# Patient Record
Sex: MALE | Race: OTHER | Employment: OTHER | URBAN - METROPOLITAN AREA
[De-identification: names, ages, dates, MRNs, and addresses within clinical notes are randomized per-mention and may not be internally consistent; named-entity substitution may affect disease eponyms.]

---

## 2020-01-01 ENCOUNTER — APPOINTMENT (OUTPATIENT)
Dept: CV DIAGNOSTICS | Facility: HOSPITAL | Age: 80
DRG: 177 | End: 2020-01-01
Attending: INTERNAL MEDICINE
Payer: MEDICARE

## 2020-01-01 ENCOUNTER — APPOINTMENT (OUTPATIENT)
Dept: CT IMAGING | Facility: HOSPITAL | Age: 80
End: 2020-01-01
Attending: EMERGENCY MEDICINE
Payer: MEDICARE

## 2020-01-01 ENCOUNTER — APPOINTMENT (OUTPATIENT)
Dept: GENERAL RADIOLOGY | Facility: HOSPITAL | Age: 80
End: 2020-01-01
Attending: EMERGENCY MEDICINE
Payer: MEDICARE

## 2020-01-01 ENCOUNTER — HOSPITAL ENCOUNTER (INPATIENT)
Facility: HOSPITAL | Age: 80
LOS: 2 days | DRG: 177 | End: 2020-01-01
Attending: HOSPITALIST | Admitting: HOSPITALIST
Payer: OTHER MISCELLANEOUS

## 2020-01-01 ENCOUNTER — APPOINTMENT (OUTPATIENT)
Dept: GENERAL RADIOLOGY | Facility: HOSPITAL | Age: 80
DRG: 177 | End: 2020-01-01
Attending: HOSPITALIST
Payer: MEDICARE

## 2020-01-01 ENCOUNTER — APPOINTMENT (OUTPATIENT)
Dept: NUCLEAR MEDICINE | Facility: HOSPITAL | Age: 80
DRG: 177 | End: 2020-01-01
Attending: INTERNAL MEDICINE
Payer: MEDICARE

## 2020-01-01 ENCOUNTER — HOSPITAL ENCOUNTER (EMERGENCY)
Facility: HOSPITAL | Age: 80
Discharge: HOME | End: 2020-01-01
Attending: EMERGENCY MEDICINE
Payer: MEDICARE

## 2020-01-01 ENCOUNTER — APPOINTMENT (OUTPATIENT)
Dept: GENERAL RADIOLOGY | Facility: HOSPITAL | Age: 80
DRG: 177 | End: 2020-01-01
Attending: INTERNAL MEDICINE
Payer: MEDICARE

## 2020-01-01 ENCOUNTER — APPOINTMENT (OUTPATIENT)
Dept: GENERAL RADIOLOGY | Facility: HOSPITAL | Age: 80
DRG: 177 | End: 2020-01-01
Attending: EMERGENCY MEDICINE
Payer: MEDICARE

## 2020-01-01 ENCOUNTER — HOSPITAL ENCOUNTER (INPATIENT)
Facility: HOSPITAL | Age: 80
LOS: 6 days | Discharge: HOSPICE | DRG: 177 | End: 2020-01-01
Attending: EMERGENCY MEDICINE | Admitting: HOSPITALIST
Payer: MEDICARE

## 2020-01-01 VITALS
TEMPERATURE: 98 F | DIASTOLIC BLOOD PRESSURE: 81 MMHG | HEIGHT: 68 IN | SYSTOLIC BLOOD PRESSURE: 146 MMHG | BODY MASS INDEX: 25.76 KG/M2 | HEART RATE: 80 BPM | WEIGHT: 170 LBS | OXYGEN SATURATION: 98 % | RESPIRATION RATE: 16 BRPM

## 2020-01-01 VITALS
OXYGEN SATURATION: 91 % | RESPIRATION RATE: 18 BRPM | TEMPERATURE: 98 F | SYSTOLIC BLOOD PRESSURE: 51 MMHG | DIASTOLIC BLOOD PRESSURE: 36 MMHG | HEART RATE: 82 BPM

## 2020-01-01 VITALS
HEIGHT: 69 IN | DIASTOLIC BLOOD PRESSURE: 74 MMHG | TEMPERATURE: 98 F | SYSTOLIC BLOOD PRESSURE: 134 MMHG | RESPIRATION RATE: 38 BRPM | OXYGEN SATURATION: 88 % | WEIGHT: 110.69 LBS | HEART RATE: 86 BPM | BODY MASS INDEX: 16.4 KG/M2
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
## 2020-01-01 DIAGNOSIS — I50.9 ACUTE ON CHRONIC CONGESTIVE HEART FAILURE, UNSPECIFIED HEART FAILURE TYPE (HCC): Primary | ICD-10-CM

## 2020-01-01 DIAGNOSIS — T14.8XXA MULTIPLE SKIN TEARS: Primary | ICD-10-CM

## 2020-01-01 PROCEDURE — 99222 1ST HOSP IP/OBS MODERATE 55: CPT | Performed by: REGISTERED NURSE

## 2020-01-01 PROCEDURE — 71045 X-RAY EXAM CHEST 1 VIEW: CPT | Performed by: HOSPITALIST

## 2020-01-01 PROCEDURE — 70450 CT HEAD/BRAIN W/O DYE: CPT | Performed by: EMERGENCY MEDICINE

## 2020-01-01 PROCEDURE — 99233 SBSQ HOSP IP/OBS HIGH 50: CPT | Performed by: HOSPITALIST

## 2020-01-01 PROCEDURE — 71045 X-RAY EXAM CHEST 1 VIEW: CPT | Performed by: EMERGENCY MEDICINE

## 2020-01-01 PROCEDURE — 78452 HT MUSCLE IMAGE SPECT MULT: CPT | Performed by: INTERNAL MEDICINE

## 2020-01-01 PROCEDURE — 99233 SBSQ HOSP IP/OBS HIGH 50: CPT | Performed by: INTERNAL MEDICINE

## 2020-01-01 PROCEDURE — 99223 1ST HOSP IP/OBS HIGH 75: CPT | Performed by: HOSPITALIST

## 2020-01-01 PROCEDURE — 93306 TTE W/DOPPLER COMPLETE: CPT | Performed by: INTERNAL MEDICINE

## 2020-01-01 PROCEDURE — 72125 CT NECK SPINE W/O DYE: CPT | Performed by: EMERGENCY MEDICINE

## 2020-01-01 PROCEDURE — 99222 1ST HOSP IP/OBS MODERATE 55: CPT | Performed by: HOSPITALIST

## 2020-01-01 PROCEDURE — 71046 X-RAY EXAM CHEST 2 VIEWS: CPT | Performed by: INTERNAL MEDICINE

## 2020-01-01 PROCEDURE — 74230 X-RAY XM SWLNG FUNCJ C+: CPT | Performed by: INTERNAL MEDICINE

## 2020-01-01 PROCEDURE — 73502 X-RAY EXAM HIP UNI 2-3 VIEWS: CPT | Performed by: EMERGENCY MEDICINE

## 2020-01-01 PROCEDURE — 73560 X-RAY EXAM OF KNEE 1 OR 2: CPT | Performed by: EMERGENCY MEDICINE

## 2020-01-01 PROCEDURE — 99284 EMERGENCY DEPT VISIT MOD MDM: CPT

## 2020-01-01 PROCEDURE — 93017 CV STRESS TEST TRACING ONLY: CPT | Performed by: INTERNAL MEDICINE

## 2020-01-01 RX ORDER — ATORVASTATIN CALCIUM 10 MG/1
5 TABLET, FILM COATED ORAL NIGHTLY
Status: DISCONTINUED | OUTPATIENT
Start: 2020-01-01 | End: 2020-01-01

## 2020-01-01 RX ORDER — ACETAMINOPHEN 325 MG/1
650 TABLET ORAL EVERY 6 HOURS PRN
COMMUNITY

## 2020-01-01 RX ORDER — DEXTROSE MONOHYDRATE AND SODIUM CHLORIDE 5; .225 G/100ML; G/100ML
INJECTION, SOLUTION INTRAVENOUS CONTINUOUS
Status: DISCONTINUED | OUTPATIENT
Start: 2020-01-01 | End: 2020-01-01

## 2020-01-01 RX ORDER — AMLODIPINE BESYLATE 5 MG/1
5 TABLET ORAL DAILY
COMMUNITY

## 2020-01-01 RX ORDER — SODIUM CHLORIDE 0.9 % (FLUSH) 0.9 %
10 SYRINGE (ML) INJECTION AS NEEDED
Status: DISCONTINUED | OUTPATIENT
Start: 2020-01-01 | End: 2020-01-01

## 2020-01-01 RX ORDER — FUROSEMIDE 10 MG/ML
40 INJECTION INTRAMUSCULAR; INTRAVENOUS ONCE
Status: COMPLETED | OUTPATIENT
Start: 2020-01-01 | End: 2020-01-01

## 2020-01-01 RX ORDER — POTASSIUM CHLORIDE 20 MEQ/1
40 TABLET, EXTENDED RELEASE ORAL EVERY 4 HOURS
Status: COMPLETED | OUTPATIENT
Start: 2020-01-01 | End: 2020-01-01

## 2020-01-01 RX ORDER — ACETAMINOPHEN 325 MG/1
650 TABLET ORAL EVERY 6 HOURS PRN
Status: DISCONTINUED | OUTPATIENT
Start: 2020-01-01 | End: 2020-01-01

## 2020-01-01 RX ORDER — POTASSIUM CHLORIDE 14.9 MG/ML
20 INJECTION INTRAVENOUS ONCE
Status: COMPLETED | OUTPATIENT
Start: 2020-01-01 | End: 2020-01-01

## 2020-01-01 RX ORDER — DOCUSATE SODIUM 100 MG/1
100 CAPSULE, LIQUID FILLED ORAL DAILY
COMMUNITY

## 2020-01-01 RX ORDER — SODIUM CHLORIDE 0.9 % (FLUSH) 0.9 %
3 SYRINGE (ML) INJECTION AS NEEDED
Status: DISCONTINUED | OUTPATIENT
Start: 2020-01-01 | End: 2020-01-01

## 2020-01-01 RX ORDER — DOCUSATE SODIUM 100 MG/1
100 CAPSULE, LIQUID FILLED ORAL DAILY PRN
Status: DISCONTINUED | OUTPATIENT
Start: 2020-01-01 | End: 2020-01-01

## 2020-01-01 RX ORDER — MORPHINE SULFATE 2 MG/ML
1 INJECTION, SOLUTION INTRAMUSCULAR; INTRAVENOUS ONCE
Status: DISCONTINUED | OUTPATIENT
Start: 2020-01-01 | End: 2020-01-01

## 2020-01-01 RX ORDER — METOPROLOL SUCCINATE 25 MG/1
25 TABLET, EXTENDED RELEASE ORAL DAILY
COMMUNITY

## 2020-01-01 RX ORDER — HEPARIN SODIUM 5000 [USP'U]/ML
5000 INJECTION, SOLUTION INTRAVENOUS; SUBCUTANEOUS EVERY 12 HOURS SCHEDULED
Status: DISCONTINUED | OUTPATIENT
Start: 2020-01-01 | End: 2020-01-01

## 2020-01-01 RX ORDER — ATROPINE SULFATE 10 MG/ML
2 SOLUTION/ DROPS OPHTHALMIC EVERY 2 HOUR PRN
Status: DISCONTINUED | OUTPATIENT
Start: 2020-01-01 | End: 2020-01-01

## 2020-01-01 RX ORDER — FUROSEMIDE 40 MG/1
40 TABLET ORAL EVERY 8 HOURS PRN
Status: DISCONTINUED | OUTPATIENT
Start: 2020-01-01 | End: 2020-01-01

## 2020-01-01 RX ORDER — FUROSEMIDE 10 MG/ML
40 INJECTION INTRAMUSCULAR; INTRAVENOUS EVERY 8 HOURS PRN
Status: DISCONTINUED | OUTPATIENT
Start: 2020-01-01 | End: 2020-01-01

## 2020-01-01 RX ORDER — HALOPERIDOL 5 MG/ML
1 INJECTION INTRAMUSCULAR
Status: DISCONTINUED | OUTPATIENT
Start: 2020-01-01 | End: 2020-01-01

## 2020-01-01 RX ORDER — ONDANSETRON 2 MG/ML
4 INJECTION INTRAMUSCULAR; INTRAVENOUS EVERY 6 HOURS PRN
Status: DISCONTINUED | OUTPATIENT
Start: 2020-01-01 | End: 2020-01-01

## 2020-01-01 RX ORDER — POTASSIUM CHLORIDE 29.8 MG/ML
INJECTION INTRAVENOUS
Status: DISPENSED
Start: 2020-01-01 | End: 2020-01-01

## 2020-01-01 RX ORDER — MINERAL OIL AND PETROLATUM 150; 830 MG/G; MG/G
OINTMENT OPHTHALMIC 4 TIMES DAILY
Status: DISCONTINUED | OUTPATIENT
Start: 2020-01-01 | End: 2020-01-01

## 2020-01-01 RX ORDER — SODIUM CHLORIDE 9 MG/ML
INJECTION, SOLUTION INTRAVENOUS CONTINUOUS
Status: DISCONTINUED | OUTPATIENT
Start: 2020-01-01 | End: 2020-01-01

## 2020-01-01 RX ORDER — GLYCOPYRROLATE 0.2 MG/ML
0.4 INJECTION, SOLUTION INTRAMUSCULAR; INTRAVENOUS
Status: DISCONTINUED | OUTPATIENT
Start: 2020-01-01 | End: 2020-01-01

## 2020-01-01 RX ORDER — SCOLOPAMINE TRANSDERMAL SYSTEM 1 MG/1
1 PATCH, EXTENDED RELEASE TRANSDERMAL
Status: DISCONTINUED | OUTPATIENT
Start: 2020-01-01 | End: 2020-01-01

## 2020-01-01 RX ORDER — MORPHINE SULFATE 2 MG/ML
1 INJECTION, SOLUTION INTRAMUSCULAR; INTRAVENOUS EVERY 4 HOURS PRN
Status: DISCONTINUED | OUTPATIENT
Start: 2020-01-01 | End: 2020-01-01

## 2020-01-01 RX ORDER — LORAZEPAM 2 MG/ML
2 INJECTION INTRAMUSCULAR EVERY 4 HOURS PRN
Status: DISCONTINUED | OUTPATIENT
Start: 2020-01-01 | End: 2020-01-01

## 2020-01-01 RX ORDER — METOPROLOL SUCCINATE 25 MG/1
25 TABLET, EXTENDED RELEASE ORAL DAILY
Status: DISCONTINUED | OUTPATIENT
Start: 2020-01-01 | End: 2020-01-01

## 2020-01-01 RX ORDER — IPRATROPIUM BROMIDE AND ALBUTEROL SULFATE 2.5; .5 MG/3ML; MG/3ML
3 SOLUTION RESPIRATORY (INHALATION) EVERY 6 HOURS PRN
Status: DISCONTINUED | OUTPATIENT
Start: 2020-01-01 | End: 2020-01-01

## 2020-01-01 RX ORDER — MULTIVIT-MIN/IRON FUM/FOLIC AC 7.5 MG-4
1 TABLET ORAL DAILY
COMMUNITY

## 2020-01-01 RX ORDER — ATORVASTATIN CALCIUM 10 MG/1
5 TABLET, FILM COATED ORAL NIGHTLY
COMMUNITY

## 2020-01-01 RX ORDER — MELATONIN
325
COMMUNITY

## 2020-01-01 RX ORDER — BISACODYL 10 MG
10 SUPPOSITORY, RECTAL RECTAL
Status: DISCONTINUED | OUTPATIENT
Start: 2020-01-01 | End: 2020-01-01

## 2020-01-01 RX ORDER — ASPIRIN 81 MG
10 TABLET, DELAYED RELEASE (ENTERIC COATED) ORAL EVERY 4 HOURS PRN
COMMUNITY

## 2020-01-01 RX ORDER — METOCLOPRAMIDE HYDROCHLORIDE 5 MG/ML
5 INJECTION INTRAMUSCULAR; INTRAVENOUS EVERY 8 HOURS PRN
Status: DISCONTINUED | OUTPATIENT
Start: 2020-01-01 | End: 2020-01-01

## 2020-01-01 RX ORDER — MIRTAZAPINE 15 MG/1
15 TABLET, FILM COATED ORAL NIGHTLY
Status: DISCONTINUED | OUTPATIENT
Start: 2020-01-01 | End: 2020-01-01

## 2020-01-01 RX ORDER — AMIODARONE HYDROCHLORIDE 200 MG/1
200 TABLET ORAL DAILY
Status: DISCONTINUED | OUTPATIENT
Start: 2020-01-01 | End: 2020-01-01

## 2020-01-01 RX ORDER — AMIODARONE HYDROCHLORIDE 200 MG/1
200 TABLET ORAL 2 TIMES DAILY
Status: DISCONTINUED | OUTPATIENT
Start: 2020-01-01 | End: 2020-01-01

## 2020-01-01 RX ORDER — MINERAL OIL AND PETROLATUM 150; 830 MG/G; MG/G
OINTMENT OPHTHALMIC 4 TIMES DAILY
COMMUNITY

## 2020-01-01 RX ORDER — TUBERCULIN PURIFIED PROTEIN DERIVATIVE 5 [IU]/.1ML
0.1 INJECTION INTRADERMAL ONCE
COMMUNITY

## 2020-01-01 RX ORDER — MORPHINE SULFATE 2 MG/ML
1 INJECTION, SOLUTION INTRAMUSCULAR; INTRAVENOUS
Status: DISCONTINUED | OUTPATIENT
Start: 2020-01-01 | End: 2020-01-01

## 2020-01-01 RX ORDER — MORPHINE SULFATE IN 0.9 % NACL 1 MG/ML
1 PLASTIC BAG, INJECTION (ML) INTRAVENOUS CONTINUOUS PRN
Status: DISCONTINUED | OUTPATIENT
Start: 2020-01-01 | End: 2020-01-01

## 2020-01-01 RX ORDER — LORAZEPAM 2 MG/ML
1 INJECTION INTRAMUSCULAR EVERY 4 HOURS PRN
Status: DISCONTINUED | OUTPATIENT
Start: 2020-01-01 | End: 2020-01-01

## 2020-01-01 RX ORDER — LORAZEPAM 2 MG/ML
0.5 INJECTION INTRAMUSCULAR EVERY 4 HOURS PRN
Status: DISCONTINUED | OUTPATIENT
Start: 2020-01-01 | End: 2020-01-01

## 2020-01-01 RX ORDER — POTASSIUM CHLORIDE 750 MG/1
10 TABLET, EXTENDED RELEASE ORAL DAILY
COMMUNITY

## 2020-01-01 RX ORDER — QUETIAPINE 25 MG/1
25 TABLET, FILM COATED ORAL NIGHTLY
Status: DISCONTINUED | OUTPATIENT
Start: 2020-01-01 | End: 2020-01-01

## 2020-01-01 RX ORDER — ONDANSETRON 2 MG/ML
4 INJECTION INTRAMUSCULAR; INTRAVENOUS EVERY 4 HOURS PRN
Status: DISCONTINUED | OUTPATIENT
Start: 2020-01-01 | End: 2020-01-01 | Stop reason: HOSPADM

## 2020-01-01 RX ORDER — POTASSIUM CHLORIDE 1.5 G/1.77G
40 POWDER, FOR SOLUTION ORAL ONCE
Status: COMPLETED | OUTPATIENT
Start: 2020-01-01 | End: 2020-01-01

## 2020-01-01 RX ORDER — HALOPERIDOL 5 MG/ML
2 INJECTION INTRAMUSCULAR
Status: DISCONTINUED | OUTPATIENT
Start: 2020-01-01 | End: 2020-01-01

## 2020-01-01 RX ORDER — DEXTROSE MONOHYDRATE 25 G/50ML
50 INJECTION, SOLUTION INTRAVENOUS
Status: DISCONTINUED | OUTPATIENT
Start: 2020-01-01 | End: 2020-01-01

## 2020-01-01 RX ORDER — MIRTAZAPINE 15 MG/1
15 TABLET, FILM COATED ORAL NIGHTLY
COMMUNITY

## 2020-01-01 RX ORDER — AMIODARONE HYDROCHLORIDE 200 MG/1
200 TABLET ORAL 2 TIMES DAILY
COMMUNITY

## 2020-02-23 NOTE — ED INITIAL ASSESSMENT (HPI)
Pt arrive in ER from [facility] per [facility] with c/o tripped and fell while trying to get out in bed pt hit his head, c/o right hip pain + right knee pain, denies loc, pt has h/o Dementia and on aspirin+Eliquis,aox3

## 2020-02-23 NOTE — ED PROVIDER NOTES
Patient Seen in: [facility] AND [facility] Emergency Department      History   Patient presents with:  Fall    Stated Complaint: fall    HPI    Patient is an 58-year-old male who arrives by EMS from [facility] for unwitnessed fall.   He was found on the f brain/cspine: Small left frontal scalp hematoma. No acute intracranial bleed or calvarial fracture. No acute cervical spine fracture or malalignment. Multilevel degenerative changes with shallow disc bulges and herniations.   No acute fracture of right k

## 2020-02-23 NOTE — ED NOTES
Pt will be discharge back to [facility] per [address], report given to [facility], voices no concern, awaiting for [facility] to return pt back to [facility]

## 2020-02-23 NOTE — CM/SW NOTE
[facility] form completed and copy placed in medical records tray for scanning. RN aware.

## 2020-09-25 PROBLEM — I50.9 ACUTE ON CHRONIC CONGESTIVE HEART FAILURE, UNSPECIFIED HEART FAILURE TYPE (HCC): Status: ACTIVE | Noted: 2020-01-01

## 2020-09-25 PROBLEM — I50.9 ACUTE ON CHRONIC CONGESTIVE HEART FAILURE (HCC): Status: ACTIVE | Noted: 2020-01-01

## 2020-09-25 NOTE — PROGRESS NOTES
Pt received awake but confused. Has dementia at baseline. From [facility]. With dysphagia risk factors so made NPO. Swallow eval already ordered. Per RN at [facility], pt is an assist to wheel chair but unable to ambulate. Fluids and IV zosyn.  Educated

## 2020-09-25 NOTE — ED NOTES
Orders for admission, patient is aware of plan and ready to go upstairs. Any questions, please call ED MARITZA Santana  at extension [phone].    Type of COVID test sent:  COVID Suspicion level: Low    Titratable drug(s) infusing:NONE  Rate:    LOC at time of transpo

## 2020-09-25 NOTE — ED NOTES
The writer called [facility] update and report given, to May RN    The writer also spoke with pt's Daughter Jefferson Yeung, update given.   Contact # [phone]

## 2020-09-25 NOTE — ED PROVIDER NOTES
Patient Seen in: [facility] AND [facility] Emergency Department    History   No chief complaint on file. HPI    Patient presents to the ED complaining of shortness of breath.   Apparently started on Zosyn 3 days ago at his care facility for suspected pneumon arrival to the room.  Personal protective equipment including droplet mask, eye protection, and gloves were worn throughout the duration of the exam.  Handwashing was performed prior to and after the exam.  Stethoscope and any equipment used during my exami Absolute Prelim 9.87 (*)     Neutrophil Absolute 9.87 (*)     Lymphocyte Absolute 0.38 (*)     All other components within normal limits   RAPID SARS-COV-2 BY PCR - Normal   CBC WITH DIFFERENTIAL WITH PLATELET    Narrative:      The following orders were cr records for any recent pertinent discharge summaries, testing, and procedures and reviewed those reports. Complicating Factors: The patient already has does not have any pertinent problems on file. to contribute to the complexity of this ED evaluation.

## 2020-09-25 NOTE — CONSULTS
[facility] HOSP - [facility]    Report of Consultation    Fanta Adam Patient Status:  Emergency    1940 MRN [id number]   Location [address] Attending Jareth Flores MD   Hosp Day # 0 PCP Kanu Childs     Date of shortness of breath. He is losing weight and not eating well. From old records at [facility] he had normal LV function in the past.  He also has been treated for stroke and diabetes. Patient also was diagnosed with possible pneumonia a couple of days ago. output data in the 24 hours ending 09/25/20 1422   This shift: No intake/output data recorded. Scheduled Meds:      Physical Exam:           General:  Alert and oriented x 3. Patient cachectic  no apparent distress.  No respiratory or constitutional

## 2020-09-25 NOTE — H&P
[facility]    PATIENT'S NAME: Cintia Afsaneh PHYSICIAN: Jimmie Mustafa MD   PATIENT ACCOUNT#:   [id number]    LOCATION:  [place] [address]  MEDICAL RECORD #:   [id number]       YOB: 1940  ADMISSION DATE:       09/25/202 functional level. REVIEW OF SYSTEMS:  Unable to obtain from the patient. He is a poor historian. He says he is short of breath, but he cannot comment further. Other 12-point review of systems is unobtainable.       PHYSICAL EXAMINATION:    GENERAL: [address] [id number]  FB/

## 2020-09-25 NOTE — ED INITIAL ASSESSMENT (HPI)
Pt brought in by [facility] from [facility] for c/o SOB that started 2-3 days with desaturation. Pt was diagnosed with Pneumonia Tuesday, currently on IV Zosyn. Tylenol given in the Nursing home at 0930 for temp 99.8.   COVID test done Tuesday with ne

## 2020-09-25 NOTE — CM/SW NOTE
Received call from May RN, patient coming from [facility]. Diagnosed with pneumonia a few days ago. Has received 4 doses of IV Zosyn. Has Peripheral IV. A&O x 2-3    WBC on 9/24/20 14.2    Fever today 99.9. Patient on 8L O2 sating 88%.

## 2020-09-26 NOTE — PLAN OF CARE
Problem: Safety Risk - Non-Violent Restraints  Goal: Patient will remain free from self-harm  Description: INTERVENTIONS:  - Apply the least restrictive restraint to prevent harm  - Notify patient and family of reasons restraints applied  - Assess for an

## 2020-09-26 NOTE — PROGRESS NOTES
[facility]    Progress Note    Leighton Beal Patient Status:  Inpatient    1940 MRN [id number]   Location [facility] 3W/SW Attending Terri Benitez MD   Hosp Day # 1 PCP Speedy Bond       Subjective:   Leighton Beal is (cpt=71045)    Result Date: 9/25/2020  CONCLUSION:  1. CHF with bilateral pulmonary edema. Coexisting pneumonia should be excluded clinically.     Dictated by (CST): Vel Resendez MD on 9/25/2020 at 1:01 PM     Finalized by (CST): Vel Resendez MD on 9/2

## 2020-09-26 NOTE — OCCUPATIONAL THERAPY NOTE
Pt currently on hold 2/2 decreased OT status. Will attempt again as appropriate and as schedule permits.

## 2020-09-26 NOTE — DIETARY NOTE
ADULT NUTRITION INITIAL ASSESSMENT    Pt is at high nutrition risk. Pt meets severe malnutrition criteria.       CRITERIA FOR MALNUTRITION DIAGNOSIS:  Criteria for severe malnutrition diagnosis: chronic illness related to energy intake less than 75% for gr unspecified heart failure type ([facility] [I50.9]    PERTINENT PAST MEDICAL HISTORY:   Past Medical History:   Diagnosis Date   • Aftercare following right hip joint replacement surgery    • Aftercare following surgery of the circulatory system    • Artificial CREATSERUM 1.47* 1.44* 1.54*   CA 8.8 8.7 8.3*   MG  --   --  2.1   * 150* 150*   K 3.7 3.5 3.0*   * 116* 113*   CO2 27.0 30.0 31.0   OSMOCALC 317* 322* 321*     NUTRITION RELATED PHYSICAL FINDINGS:  - Body Fat/Muscle Mass: severe depletion b

## 2020-09-26 NOTE — PHYSICAL THERAPY NOTE
Chart reviewed. Per RN note this AM, \"Pt removing nonrebreather mask and will desat into the low 70s-80s, unable to comply with plan of care, will reassess as needed to discontinue. \" Pt not appropriate for therapy evaluation at this time d/t unstable res

## 2020-09-26 NOTE — SLP NOTE
ADULT SWALLOWING EVALUATION    ASSESSMENT    ASSESSMENT/OVERALL IMPRESSION:  PPE REQUIRED. THIS SLP WORE GLOVES AND DROPLET MASK. HANDS SANITIZED/WASHED UPON ENTRANCE/EXIT. Orders received, chart reviewed. Pt seen sitting upright in bed for BSSE.  Pt let type [facility])  Active Problems:    Acute on chronic congestive heart failure [facility])    Past Medical History  Past Medical History:   Diagnosis Date   • Aftercare following right hip joint replacement surgery    • Aftercare following surgery of the circulatory patient will participate in a RE-BSSE on 9/27/20, more goals to follow pending results of RE-BSSE.   In Progress     FOLLOW UP  Treatment Plan/Recommendations: SLP to reassess  Number of Visits to Meet Established Goals: 3  Follow Up Needed: Yes  SLP Follow

## 2020-09-26 NOTE — PROGRESS NOTES
[facility] HOSP - [facility]    Cardiology Progress Note    Jovanny Spear Patient Status:  Inpatient    1940 MRN [id number]   Location [facility] 3W/SW Attending Karen Oliveros MD   Hosp Day # 1 PCP [facility]         Assessment and Pl Heart with regular rate and rhythm,no pathologic murmur  Abd: Abdomen soft, nontender, nondistended,  bowel sounds present  Ext:  no clubbing, no cyanosis  Neuro: no focal deficits  Skin: no rashes or lesions      Scheduled Meds:   • potassium chloride  40

## 2020-09-26 NOTE — PLAN OF CARE
Problem: Patient Centered Care  Goal: Patient preferences are identified and integrated in the patient's plan of care  Description: Interventions:  - What would you like us to know as we care for you?  From [facility]   - Provide timely, complete, and accur including physical limitations  - Instruct pt to call for assistance with activity based on assessment  - Modify environment to reduce risk of injury  - Provide assistive devices as appropriate  - Consider OT/PT consult to assist with strengthening/mobilit

## 2020-09-27 NOTE — PLAN OF CARE
Problem: Patient Centered Care  Goal: Patient preferences are identified and integrated in the patient's plan of care  Description: Interventions:  - What would you like us to know as we care for you?  From [facility]   - Provide timely, complete, and accur safety including physical limitations  - Instruct pt to call for assistance with activity based on assessment  - Modify environment to reduce risk of injury  - Provide assistive devices as appropriate  - Consider OT/PT consult to assist with strengthening/

## 2020-09-27 NOTE — SLP NOTE
ADULT RE-SWALLOWING EVALUATION    ASSESSMENT    ASSESSMENT/OVERALL IMPRESSION:  PPE REQUIRED. THIS THERAPIST WORE GLOVES AND DROPLET MASK FOR DURATION OF EVALUATION. HANDS WASHED UPON ENTRANCE/EXIT.     A repeat swallow evaluation warranted as pt made NPO d Precautions: Upright position; Slow rate;Small bites and sips; No straw  Medication Administration Recommendations: Crushed in puree  Treatment Plan/Recommendations: Aspiration precautions  Discharge Recommendations/Plan: Undetermined    HISTORY   MEDICAL HI Upright    Oral Phase of Swallow: Impaired  Bolus Retrieval: Intact  Bilabial Seal: Intact  Bolus Formation: Impaired  Bolus Propulsion: Impaired  Mastication: Impaired  Retention: Impaired    Pharyngeal Phase of Swallow: Impaired  Laryngeal Elevation  Dre Rhodes [facility]  Speech Language Pathologist  Phone Number Ext. [phone]

## 2020-09-27 NOTE — PROGRESS NOTES
Attempted eval, declined by RN at this time due to Pt's agitation and currently being on restraints. Will re-attempt asap.    Larissa Wilcox, PT

## 2020-09-27 NOTE — OCCUPATIONAL THERAPY NOTE
Order received and chart reviewed. Consulted with RN prior to seeing patient. Per RN, patient is not stable enough at this time to participate in therapy plan of care. Additionally, it is unclear what is the patient's PLOF. Will follow up at a later date.

## 2020-09-27 NOTE — PROGRESS NOTES
[facility]    Progress Note    Yaritza Tubbs Patient Status:  Inpatient    1940 MRN [id number]   Location [facility] 3W/SW Attending Venita Albert MD   Hosp Day # 2 PCP [facility]       Subjective:   Yaritza Tubbs is Slight progression of diffuse bilateral pulmonary opacification.     Dictated by (CST): Rupesh Mustafa MD on 9/26/2020 at 3:16 PM     Finalized by (CST): Rupesh Mustafa MD on 9/26/2020 at 3:19 PM           222 (cpt Dementia. Chronic atrial fibrillation. Ch. Anemia  Hx of HTN      Prophylaxis  -hep s q    Dispo--> pending  Prognosis-> guarded  Greater than 35 minutes spent, >50% spent counseling re: treatment plan and work up.       Danielle Reed MD  **Certific

## 2020-09-27 NOTE — PROGRESS NOTES
[facility] HOSP - [facility]    Progress Note    Yelena Aragon Patient Status:  Inpatient    1940 MRN [id number]   Location [facility] 3W/SW Attending Adriana Blake MD   Hosp Day # 2 PCP [facility]       Subjective:   Yelena Aragon is Pa + Lat Chest (cpt=[id number])    Result Date: 9/26/2020  CONCLUSION:  1. Slight progression of diffuse bilateral pulmonary opacification.     Dictated by (CST): María Mustafa MD on 9/26/2020 at 3:16 PM     Finalized by (CST): Suzan Mustafa, poor po intake  -improved  -push po intake    Parkinson's disease. Dementia. Chronic atrial fibrillation.     Ch. Anemia  Hx of HTN    Prophylaxis  -hep sq    Dispo--> pending  Code status: DNAR  Prognosis-> guarded will discuss adding palliative care co

## 2020-09-27 NOTE — PLAN OF CARE
Problem: SAFETY ADULT - FALL  Goal: Free from fall injury  Description: INTERVENTIONS:  - Assess pt frequently for physical needs  - Identify cognitive and physical deficits and behaviors that affect risk of falls.   - [place] fall precautions as indica the need to continue restraints  Outcome: Not Progressing     AO1, on IV fluids, failed speech eval, will reassess eval in am, EF 20%, lexiscan done, K covered, restraints in place, Will continue to monitor for now.

## 2020-09-27 NOTE — PROGRESS NOTES
· [facility]      Subjective:  Denies chest pain or dyspnea.    Confused last night still has restraints     Objective:  134/81  Afebrile  Av paced  I/O + 450    · BUN/cr 33/1.46 Na 149  K 3.2  Hgb 8.9  · Echo EF 20% generalized HK  · Nuclear E

## 2020-09-28 PROBLEM — Z71.89 GOALS OF CARE, COUNSELING/DISCUSSION: Status: ACTIVE | Noted: 2020-01-01

## 2020-09-28 PROBLEM — Z71.89 ADVANCE CARE PLANNING: Status: ACTIVE | Noted: 2020-01-01

## 2020-09-28 NOTE — SLP NOTE
SPEECH DAILY NOTE - INPATIENT    ASSESSMENT & PLAN   ASSESSMENT  PPE REQUIRED. THIS THERAPIST WORE GLOVES AND DROPLET MASK FOR DURATION OF TX SESSION. HANDS WASHED UPON ENTRANCE/EXIT.      SLP f/u for ongoing meal assessment per recommendations of puree die VFSS.  Oxygen status reduced to 92-93% throughout the trials of current diet. No throat clearing or coughing was present. Trial of NTL by giuseppe completed with throat clearing. No diet upgrade at this time. New CXR on 9/27/20 worsening.   Continue curr all consistencies. CXR worsening. Recommend VFSS to better assess pharyngeal function and rule out silent aspiration.   In Progress   Goal #3 The patient/family/caregiver will demonstrate understanding and implementation of aspiration precautions and swal

## 2020-09-28 NOTE — CDS QUERY
How to Answer this Query    1.) Click \"Edit Button\" on the toolbar  2.) Type an \"X\" in the bracket for the diagnosis that applies. (You may also add additional clinical details as you feel necessary to substantiate your response).   3.) Finally click \" IBW  Usual Body Wt: 120 lbs       90% UBW  NUTRITION RELATED PHYSICAL FINDINGS:  - Body Fat/Muscle Mass: severe depletion body fat triceps region and fat overlying rib cage region and severe depletion muscle mass temple region, clavicle region, shoulder re

## 2020-09-28 NOTE — DIETARY NOTE
ADULT NUTRITION UPDATE NOTE    RECOMMENDATIONS TO MD:  See Nutrition Intervention    Recommend liberalize diet.  Suggest;  1) remove cardiac,  cardiac stress test  & 1.5 gm na+ restriction  2) add 3-4 gm Na+ restriction   3) keep dysphagia restrictions colitis, Cause of injury, accidental fall, subsequent encounter, Closed fracture of neck of right femur with routine healing, Parkinson's disease ([facility] and Senile dementia, uncomplicated ([facility]     ANTHROPOMETRICS:  HT: 175.3 cm (5' 9\")  WT: 49.9 kg (110 4.0   *  --  112  --  112  --    CO2 31.0  --  32.0  --  32.0  --    OSMOCALC 321*  --  316*  --  316*  --     < > = values in this interval not displayed.      NUTRITION PRESCRIPTION:  Diet: Carbohydrate Controlled 1800 calorie/60g CHO/meal, Cardiac,

## 2020-09-28 NOTE — PROGRESS NOTES
[facility] AND [facility]  [facility] Cardiology Progress Note    Tennis Million Patient Status:  Inpatient    1940 MRN [id number]   Location [facility] 3W/SW Attending Diego Pierre MD   Hosp Day # 3 PCP Luigi Mesa     Admitted  with SO 24 hours) at 9/28/2020 6885  Last data filed at 9/28/2020 0016  Gross per 24 hour   Intake 1000 ml   Output 1650 ml   Net -650 ml       Physical Exam:    General: NAD  HEENT: Normocephalic, anicteric sclera, neck supple.   Neck: No JVD, carotids 2+, no brui

## 2020-09-28 NOTE — PROGRESS NOTES
[facility]    Progress Note    Bonita Rey Patient Status:  Inpatient    1940 MRN [id number]   Location [facility] 3W/SW Attending Gill Mcgowan MD   Hosp Day # 3 PCP [facility]       Subjective:   Pt with worse potassium chloride  20 mEq Intravenous Once   • Insulin Aspart Pen  1-7 Units Subcutaneous TID CC   • amiodarone HCl  200 mg Oral Daily   • apixaban  2.5 mg Oral BID   • piperacillin-tazobactam  3.375 g Intravenous Q8H   • artificial tears   Both Eyes QID 3:19 PM          Card Nuc Stress Test Lexiscan (ane=[id number])    Result Date: 9/26/2020  CONCLUSION:  1. Mildly dilated left ventricle with global left ventricular hypokinesis and decreased LV ejection fraction of 44%.  2. Small fixed perfusion def given lasix, will hold at this time.        Hx. Os sick sinus syndrome  -on eliquis and amiodarone     Hyperosmolar hypernatremia.   - Likely due to free water deficit and poor po intake  - Encourage po intake as per SLP  - If worsens will add IVF, but hold

## 2020-09-28 NOTE — PAYOR COMM NOTE
--------------  ADMISSION REVIEW     Payor: [address] #:  [id number]  Authorization Number: [id number]    Admit date: 9/25/20  Admit time: 46       Admitting Physician: Mary Adamson MD  Attending Physician:  Simone Rodriguez Stethoscope and any equipment used during my examination was cleaned with super sani-cloth germicidal wipes following the exam.     Physical Exam   Constitutional: He is oriented to person, place, and time. He appears well-developed and well-nourished.  No Narrative: The following orders were created for panel order CBC WITH DIFFERENTIAL WITH PLATELET.                   Procedure                               Abnormality         Status                                     --------- to the complexity of this ED evaluation. ED Course: Patient presents to the ED with shortness of breath symptoms requiring initially a nonrebreather. Concern for CHF versus possible pneumonia on work-up.   Patient already on Zosyn from his care facility 116, potassium 3.5, BUN and creatinine of 33 and 1.44. GFR is 46, unknown baseline. Calculated osmolality of 322. CBC showed white blood cell count of 11.0 with a left shift; hemoglobin of 9.3. ProBNP today 17,355.   Chest x-ray showed bilateral edema v auscultated in both lung fields. HEART:  Regular rate and rhythm. S1 and S2 auscultated. No murmur. ABDOMEN:  Soft, nondistended. No tenderness. Positive bowel sounds. EXTREMITIES:  No peripheral edema, clubbing, or cyanosis.   NEUROLOGIC:  Motor and  Longest AF event on 2-12-20 10.5 minute duration, average ventricular rate 81 ppm.  Fastest AF event on 2-12-20 ventricular rate 110 ppm duration 5.5 minutes.  AF burden since 2-10-20 <1%.  On Eliquis and Amiodarone.  IB EP APNS and Dr. Fang Real.     • Metoprolol Succinate ER  25 mg Oral Daily   • mirtazapine  15 mg Oral Nightly            Results:            Lab Results   Component Value Date     WBC 10.0 09/26/2020     HGB 9.3 (L) 09/26/2020     HCT 28.5 (L) 09/26/2020     .0 09/26/2020

## 2020-09-28 NOTE — PLAN OF CARE
Problem: SAFETY ADULT - FALL  Goal: Free from fall injury  Description: INTERVENTIONS:  - Assess pt frequently for physical needs  - Identify cognitive and physical deficits and behaviors that affect risk of falls.   - [place] fall precautions as indica for the need to continue restraints  Outcome: Not Progressing    AO1, bedrest turn q2, EF 20%, IV lasix x1 given, [place] cath, palliative care consult, restraints, non rebreather mask applied, CXR done awaiting results, will continue to monitor for now.

## 2020-09-28 NOTE — PAYOR COMM NOTE
--------------  CONTINUED STAY REVIEW    Payor: [address] [place] [address] #:  [id number]  Authorization Number: [id number]    Admit date: 9/25/20  Admit time: 46    Admitting Physician: Jos Jay MD  Attending Physician:  Antolin Estes assessment and plan with changes and additions included within note (merged).   S/no cp  O/lung bcta  A/P:f/u labs  Ef better on nuc, no ischemia  Would cont medical tx  Pneumonia tx  No diuresis needed     Dairl Ahr, Sanchez Peter file.  No family history on file.     Objective:   Temp: 99 °F (37.2 °C)  Pulse: 80  Resp: 22  BP: 125/85     Intake/Output:      Intake/Output Summary (Last 24 hours) at 9/28/2020 0909  Last data filed at 9/28/2020 0016      Gross per 24 hour   Intake 100 Dose Route User    9/27/2020 1817 Given 40 mg Intravenous Thomas Cuadra RN      Insulin Aspart Pen (NOVOLOG) 100 UNIT/ML flexpen 1-7 Units     Date Action Dose Route User    9/28/2020 0834 Given 1 Units Subcutaneous (Right Lower Abdomen) Jennifer Band cannula 4 L/min AS

## 2020-09-28 NOTE — CONSULTS
[address] [place] [address] Patient Status:  Inpatient    1940 MRN [id number]   Location [facility] 3W/SW Attending Silver Chacon, [address] [facility] Se Day # 3 PCP Jose Patricia     Date of Consu pacemaker    • Bradycardia    • C. difficile colitis    • Cause of injury, accidental fall, subsequent encounter    • Closed fracture of neck of right femur with routine healing    • Parkinson's disease ([facility]    • Senile dementia, uncomplicated ([facility] 83-15 % ophthalmic ointment, , Both Eyes, QID  •  atorvastatin (LIPITOR) tab 5 mg, 5 mg, Oral, Nightly  •  carbidopa-levodopa (SINEMET)  MG per tab 1 tablet, 1 tablet, Oral, Q6H  •  docusate sodium (COLACE) cap 100 mg, 100 mg, Oral, Daily PRN  •  Met diastolic     function. 2. Aortic valve: Trileaflet; mildly thickened, moderately calcified     leaflets. There is sclerosis without stenosis. Trivial     regurgitation. Peak velocity (S): 1.58m/sec. Mean gradient (S):     5mm Hg.  Peak gradient (S): 10mm Finalized by (CST): Leonel Ball MD on 9/25/2020 at 1:04 PM            Objective:  Vital Signs:  Blood pressure 125/85, pulse 80, temperature 99 °F (37.2 °C), temperature source Axillary, resp.  rate 22, height 5' 9\" (1.753 m), weight 110 lb 0.2 oz ( preferences: speak to patient and dtr Dominique/son JR  Code status: DNAR/DNI with selective ongoing treatment  Have advanced directives been discussed with patient or healthcare power of : Yes        Healthcare Agent Appointed: Yes  Healthcare Agent's N discussion:    I discussed the importance of advance care planning prior to crisis with pt and dtr (Dominique)/son .  I confirmed that patient's primary HCPOA is son Fang Corina \"JR\" but he normally lives in [place] and dtr Nuris Strong is secondary agent and lives all of the following:direct face to face contact, history taking, physical examination, and >50% was spent counseling and coordinating care. Discussed today's visit with Lina SALAS and Dr. Naresh Josue    I will continue to follow clinically.     Thank you for a

## 2020-09-28 NOTE — OCCUPATIONAL THERAPY NOTE
OCCUPATIONAL THERAPY EVALUATION - INPATIENT     Room Number: 305/305-A  Evaluation Date: 9/28/2020  Type of Evaluation: Initial       Physician Order: IP Consult to Occupational Therapy  Reason for Therapy: ADL/IADL Dysfunction and Discharge Planning    OC in the [facility] '6 clicks' Inpatient Daily Activity Short Form. Research supports that patients with this level of impairment may benefit from return to LTC. DISCHARGE RECOMMENDATIONS  OT Discharge Recommendations: 24 hour care/supervision; Long term care Cognitive Status:  Impaired    ACTIVITIES OF DAILY LIVING ASSESSMENT  AM-PAC ‘6-Clicks’ Inpatient Daily Activity Short Form  How much help from another person does the patient currently need…  -   Putting on and taking off regular lower body clothing?: Tot

## 2020-09-28 NOTE — PLAN OF CARE
Switched to nasal cannula with 5 L O2 SpO2 91%. Pallative care consult.      Problem: Patient Centered Care  Goal: Patient preferences are identified and integrated in the patient's plan of care  Description: Interventions:  - What would you like us to know Modify environment to reduce risk of injury  - Provide assistive devices as appropriate  - Consider OT/PT consult to assist with strengthening/mobility  - Encourage toileting schedule  Outcome: Progressing     Problem: Safety Risk - Non-Violent Restraints

## 2020-09-28 NOTE — PHYSICAL THERAPY NOTE
PT functional mobility screen orders received. Chart reviewed. Pt presents at a palliative support level of care. Pt requires total lift assist for all mobility and assist with all ADL's. EF 20% PMH Parkinson's / dementia / CVA.  Pt is unable to tolerate or

## 2020-09-28 NOTE — CM/SW NOTE
MDO for Advanced Directives and POLST and [place] [address]    Pt is LTC resident at [facility]. Contacted liaison, Malathi, [phone]. Malathi states pt was full code at [facility]. Palliative Care is consulted and has written for [facility].   Gurdeep Crespo

## 2020-09-29 NOTE — PAYOR COMM NOTE
--------------  CONTINUED STAY REVIEW-------REQUESTING ADDITIONAL DAY 9/29      Payor: [address] [place] [address] #:  [id number]  Authorization Number: [id number]    Admit date: 9/25/20  Admit time: 46    Admitting Physician: Carie Stroud MD Out: -          Exam  Gen: No acute distress,   Psych:alert and oriented  HEENT: normocephalic  Neck:supple,no JVD  Pulm: Lungs diminished at the base otherwise clear, normal respiratory effort  CV: Heart with regular rate and rhythm, Nl S1,S2 ,no S3 or mu apixaban (ELIQUIS) tab 2.5 mg     Date Action Dose Route User    9/29/2020 0817 Given 2.5 mg Oral Nirmala Rowan RN    9/28/2020 2208 Given 2.5 mg Oral Laurence Roberts RN      artificial tears 83-15 % ophthalmic ointment     Date Action Dose Route Use

## 2020-09-29 NOTE — CM/SW NOTE
Received notice from [facility] w/ Palliative Care that pt has been accepted w/ [facility] for d/c. PLAN: [address] w/ [facility], pend med clear    SW/CM to remain available for support and/or discharge planning.        Br

## 2020-09-29 NOTE — SPIRITUAL CARE NOTE
The Pt. Requested prayer.  Prayer was provided and on-going prayer as long as he is in the [facility]

## 2020-09-29 NOTE — PLAN OF CARE
Problem: SAFETY ADULT - FALL  Goal: Free from fall injury  Description: INTERVENTIONS:  - Assess pt frequently for physical needs  - Identify cognitive and physical deficits and behaviors that affect risk of falls.   - [place] fall precautions as indica the need to continue restraints  Outcome: Not Progressing     AO1, dementia, 5L NC, EF 20%, condom catheter, Pallitive care consulted, Video swallow repeat in am, will continue to monitor for now.

## 2020-09-29 NOTE — PROGRESS NOTES
[facility]    Progress Note    Paola Kerns Patient Status:  Inpatient    1940 MRN [id number]   Location [facility] 3W/SW Attending Antolin Estes MD   Hosp Day # 4 PCP [facility]       Subjective:   No acute even 1-7 Units Subcutaneous TID CC   • amiodarone HCl  200 mg Oral Daily   • apixaban  2.5 mg Oral BID   • piperacillin-tazobactam  3.375 g Intravenous Q8H   • artificial tears   Both Eyes QID   • atorvastatin  5 mg Oral Nightly   • carbidopa-levodopa  1 tablet 3:19 PM          Card Nuc Stress Test Lexiscan (mgz=23233/41510/[id number])    Result Date: 9/26/2020  CONCLUSION:  1. Mildly dilated left ventricle with global left ventricular hypokinesis and decreased LV ejection fraction of 44%.  2. Small fixed perfusion def further recs     SONYA on CKD-III  - baseline creatinine 1.28  - improving today  - Appears dry on exam, previously given lasix, will hold at this time.        Hx. Os sick sinus syndrome  -on eliquis and amiodarone     Hyperosmolar hypernatremia.   - Likely d

## 2020-09-29 NOTE — PROGRESS NOTES
[facility]    Progress Note    Fanta Adam Patient Status:  Inpatient    1940 MRN [id number]   Location [facility] 3W/SW Attending Wilbert Storm MD   Hosp Day # 4 PCP Kanu Childs         Assessment and Plan:    Naseem apixaban  2.5 mg Oral BID   • piperacillin-tazobactam  3.375 g Intravenous Q8H   • artificial tears   Both Eyes QID   • atorvastatin  5 mg Oral Nightly   • carbidopa-levodopa  1 tablet Oral Q6H   • Metoprolol Succinate ER  25 mg Oral Daily   • mirtazapine

## 2020-09-29 NOTE — SLP NOTE
ADULT VIDEOFLUOROSCOPIC SWALLOWING STUDY    Admission Date: 9/25/2020  Evaluation Date: 09/29/20  Radiologist: Dr. Kyara Argueta   Diet Recommendations - Solids: Puree  Diet Recommendations - Liquid: Nectar thick    Further Follow-up:  Follow U at 7:54 PM       Finalized by (CST): Butch James MD on 9/27/2020 at 7:56 PM       Reason for Referral: R/O aspiration    Family/Patient Goals: rule out aspiration    ASSESSMENT   DYSPHAGIA ASSESSMENT  Test completed in conjunction with Radiologist.  Abhijit Thapa No  Strategy(ies) Implemented (Nectar Thick): No straws; Liquids via spoon; Slow rate; Alternate consistencies;Small bites and sips;Multiple swallows  Effectiveness: Partial     PUREE  Oral Phase of Swallow (VFSS - Puree):  Impaired  Bolus Retrieval (VFSS - Pu 3   FCM Impression: Abnormal     GOALS  Goal #1 The patient will participate in a RE-BSSE on 9/27/20, more goals to follow pending results of RE-BSSE.   Completed on 9/27  Met   Goal #2 The patient will tolerate PUREE consistency and HONEY THICK liquids wit [facility]  Speech Language Pathologist  Phone Number Ext. [phone]

## 2020-09-30 NOTE — PAYOR COMM NOTE
--------------  CONTINUED STAY REVIEW    Payor: [address] [place] [address] #:  [id number]  Authorization Number: [id number]    Admit date: 9/25/20  Admit time: 46    Admitting Physician: Sandford Duane, MD  Attending Physician:  Brisa Hernandez, lesions     Scheduled Meds:    • Potassium Chloride ER  40 mEq Oral Q4H   • Insulin Aspart Pen  1-7 Units Subcutaneous TID CC   • amiodarone HCl  200 mg Oral Daily   • apixaban  2.5 mg Oral BID   • piperacillin-tazobactam  3.375 g Intravenous Q8H   • artif 9/29/2020 1700 Given (none) Both Eyes Steffi Workman RN    9/29/2020 1249 Given 5 g Both Eyes Ivana Baker RN      atorvastatin (LIPITOR) tab 5 mg     Date Action Dose Route User    9/29/2020 2003 Given 5 mg Oral Davida Franks RN      car

## 2020-09-30 NOTE — PROGRESS NOTES
[facility]    Progress Note    Roger Sanchez Patient Status:  Inpatient    1940 MRN [id number]   Location [facility] 3W/SW Attending Madelyn Mcgovern MD   Hosp Day # 5 PCP Abdi Youssef         Assessment and Plan:    Sh HCl  200 mg Oral Daily   • apixaban  2.5 mg Oral BID   • piperacillin-tazobactam  3.375 g Intravenous Q8H   • artificial tears   Both Eyes QID   • atorvastatin  5 mg Oral Nightly   • carbidopa-levodopa  1 tablet Oral Q6H   • Metoprolol Succinate ER  25 mg

## 2020-09-30 NOTE — SLP NOTE
SPEECH DAILY NOTE - INPATIENT    ASSESSMENT & PLAN   ASSESSMENT    PPE: DROPLET MASK AND GLOVES. HAND SANITIZED UPON ENTRANCE/EXIT. Pt fatigued at bedside with daughter present during tx.  Education provided to pt/daughter re: aspiration precautions, di RE-BSSE. Completed on 9/27  Met   Goal #2 The patient will tolerate PUREE consistency and HONEY THICK liquids without overt signs or symptoms of aspiration with 100 % accuracy over 1-2 session(s).   Upgraded following VFSS on 9/29     The patient will tole

## 2020-09-30 NOTE — CM/SW NOTE
08: 45AM  Per RN rounds, pt placed on restraints overnight - will discuss w/ MD possibility of overnight meds. Pt will need to be off restraints 24hrs to return to [facility]. SW contacted TED/Malu and requested updates be sent to [facility] via [employer].     10:35AM  Re

## 2020-09-30 NOTE — PROGRESS NOTES
[facility]    Progress Note    Daphne Arriaga Patient Status:  Inpatient    1940 MRN [id number]   Location [facility] 3W/SW Attending Dashawn Chilel MD   Hosp Day # 5 PCP [facility]       Subjective:     Overnight w • apixaban  2.5 mg Oral BID   • piperacillin-tazobactam  3.375 g Intravenous Q8H   • artificial tears   Both Eyes QID   • atorvastatin  5 mg Oral Nightly   • carbidopa-levodopa  1 tablet Oral Q6H   • Metoprolol Succinate ER  25 mg Oral Daily   • mirtazap Finalized by (CST): Ney Mustafa MD on 9/26/2020 at 3:19 PM          Xr Video Swallow (cpt=74230)    Result Date: 9/29/2020  CONCLUSION:   No penetration or aspiration. Please see dedicated speech pathologist report for further details.     Dic Acute HFrEF  -bnp elevated- 17,355  -echo - from 1/2020 shows  Ef 55%, mild aortic stenosis  -echo 9/26--> ef 20% with generalized hypokinesis  -nuclear stress test- ef 44% and fixed defect  -given lasix 9/26, appears dry on exam, will hold at this time.

## 2020-09-30 NOTE — PLAN OF CARE
Problem: Patient Centered Care  Goal: Patient preferences are identified and integrated in the patient's plan of care  Description: Interventions:  - What would you like us to know as we care for you?   - Provide timely, complete, and accurate informatio Problem: Safety Risk - Non-Violent Restraints  Goal: Patient will remain free from self-harm  Description: INTERVENTIONS:  - Apply the least restrictive restraint to prevent harm  - Notify patient and family of reasons restraints applied  - Assess for an

## 2020-10-01 PROBLEM — I50.9 HEART FAILURE (HCC): Status: ACTIVE | Noted: 2020-01-01

## 2020-10-01 NOTE — CM/SW NOTE
10: Natasha  Per RN rounds, pt has bee off restraints since 9/30/2020 at 10pm. Pt may be cleared for d/c tomorrow. MANOLO spoke w/ Oh Tamayo from [facility] and provided brief update. MANOLO also sent clinical updates via [employer]. 03:50PM  Received MDO for Hospice.  Per Aman Morgan

## 2020-10-01 NOTE — SLP NOTE
SPEECH DAILY NOTE - INPATIENT    ASSESSMENT & PLAN   ASSESSMENT      PPE REQUIRED. THIS SLP WORE GLOVES AND DROPLET MASK. HANDS SANITIZED/WASHED UPON ENTRANCE/EXIT. Pt alert, afebrile and on 5 L/min 02 via NC.  Pt seen for swallow analysis per VFSS rec rate;Small bites and sips; No straw  Medication Administration Recommendations: Crushed in puree                     Discharge Recommendations  Discharge Recommendations/Plan: Undetermined    Treatment Plan  Treatment Plan/Recommendations: Aspiration precau patient with 1:1 feed assistance 100 % of the time across 2-3 sessions. Pt upright in bed. Oral intake directed by SLP slowly, in controlled amounts with consistencies alternated. Pt required cues for double/multiple swallows all swallows.  Pt would bene

## 2020-10-01 NOTE — PLAN OF CARE
Alert to self. Restraints DC last night. IV Abx given. Pt started on Seroquel tonight. Tolerating well thus far. Denies pain. Bed locked and in lowest position. Bed alarm in place. Call light in reach. Will continue to monitor.        Problem: Patient Milka ADULT  Goal: Achieves optimal ventilation and oxygenation  Description: INTERVENTIONS:  - Assess for changes in respiratory status  - Assess for changes in mentation and behavior  - Position to facilitate oxygenation and minimize respiratory effort  - Oxyg

## 2020-10-01 NOTE — HOSPICE RN NOTE
Met with pt daughter to discuss hospice services and levels. Explained that pt is GIP appropriate for management of dyspnea. Pt daughter signed consents. Discussed POC with Dr. Olive Keita and hospice medical director Dr. Leesa Bolaños.   Admitting was called, gabino

## 2020-10-01 NOTE — PAYOR COMM NOTE
--------------  CONTINUED STAY REVIEW    Payor: [address] [place] [address] #:  [id number]  Authorization Number: [id number]    Admit date: 9/25/20  Admit time: 46    Admitting Physician: Juan Lockhart MD  Attending Physician:  Jordon Owen, Metoprolol Succinate ER (Toprol XL) 24 hr tab 25 mg     Date Action Dose Route User    10/1/2020 7281 Given 25 mg Oral SalLina felipe RN      mirtazapine (REMERON) tab 15 mg     Date Action Dose Route User    9/30/2020 1954 Given 15 mg Oral Buster Freeze Temp: 98.7 °F (37.1 °C)   98.3 °F (36.8 °C) 98.3 °F (36.8 °C)   TempSrc: Axillary   Oral Axillary   SpO2: 92%   91% 95%   Weight:   110 lb 10.7 oz (50.2 kg)       Height:                 Patient Weight for the past 72 hrs:    Weight   09/25/20 1206 120 lb HGB 8.9* 9.7* 9.9* 9.2* 9.0*  9.0*   HCT 27.2* 29.5* 30.3* 28.1* 28.1*  28.1*   MCV 79.1* 78.9* 78.5* 78.5* 80.1  80.1   MCH 25.9* 25.9* 25.6* 25.7* 25.6*  25.6*   MCHC 32.7 32.9 32.7 32.7 32.0  32.0   RDW 16.2* 15.9* 16.1* 15.9* 15.9*  15.9*   NEPRELIM 6. CONCLUSION:  1. Mildly dilated left ventricle with global left ventricular hypokinesis and decreased LV ejection fraction of 44%. 2. Small fixed perfusion defect in the apex. 3. No evidence of reversible myocardial ischemia.     Dictated by (CST): Consolidated Darron SONYA on CKD-III  - baseline creatinine 1.28  - monitor  - Appears dry on exam, previously given lasix, will hold at this time - will see if it improves with a little IVF.          Hx.  Os sick sinus syndrome  -on eliquis and amiodarone     Severe protein c

## 2020-10-01 NOTE — DIETARY NOTE
NUTRITION NOTE UPDATE:    Orders for Hospice, Nutrition Services to sign off, Nutrition follow-up no longer appropriate.      Robel Tapia RD,LDN  [phone]

## 2020-10-01 NOTE — PROGRESS NOTES
[facility]    Progress Note    Dolores Breen Patient Status:  Inpatient    1940 MRN [id number]   Location [facility] 3W/SW Attending Reema Tinsley MD   Hosp Day # 6 PCP [facility]       Subjective:     Did well la BID   • piperacillin-tazobactam  3.375 g Intravenous Q8H   • artificial tears   Both Eyes QID   • atorvastatin  5 mg Oral Nightly   • carbidopa-levodopa  1 tablet Oral Q6H   • Metoprolol Succinate ER  25 mg Oral Daily   • mirtazapine  15 mg Oral Nightly Haresh Mustafa MD on 9/26/2020 at 3:16 PM     Finalized by (CST): Haresh Mustafa MD on 9/26/2020 at 3:19 PM          Xr Video Swallow (cpt=74230)    Result Date: 9/29/2020  CONCLUSION:   No penetration or aspiration.   Please see dedicated abx on d/c to complete course  - SLP on consult, appreciate recs for diet  - f/u cultures  - procalcitonin 0.98     New Acute HFrEF  -bnp elevated- 17,355  -echo - from 1/2020 shows  Ef 55%, mild aortic stenosis  -echo 9/26--> ef 20% with generalized hypok

## 2020-10-01 NOTE — RESPIRATORY THERAPY NOTE
Acute hypoxemic respiratory failure and aspiration pneumonia. Pt is on pallative consult. Received pt on 15LNRB- PT COARSE B/L BS- SPO2 82% and increase work of breathing. Place pt on VAPOTHERM- 40L-95%. SX the patient orally. VEST therapy done.    P

## 2020-10-01 NOTE — PLAN OF CARE
Problem: Patient Centered Care  Goal: Patient preferences are identified and integrated in the patient's plan of care  Description: Interventions:  - What would you like us to know as we care for you?  From [facility]  - Provide timely, complete, and acc effort  - Oxygen supplementation based on oxygen saturation or ABGs  - Provide Smoking Cessation handout, if applicable  - Encourage broncho-pulmonary hygiene including cough, deep breathe, Incentive Spirometry  - Assess the need for suctioning and perform

## 2020-10-01 NOTE — PROGRESS NOTES
Medical chart reviewed  D/w'd Floor RN  POC established  GOC established    Will sign off    Thank you for consulting our palliative care services to assist with Boogie's care  Dr Knowles Bring

## 2020-10-02 NOTE — PLAN OF CARE
Patient is only responsive to painful stimuli. On 5L of oxygen for comfort. Morphine gtt running at 2mg/hr. Scope patch behind right ear. Mariscal intact & draining. Pleasure feed. Mepilex to sacrum. Heel boots. Hospice/comfort measures maintained.  Son visite

## 2020-10-02 NOTE — HOSPICE RN NOTE
GIP day 2. Hospice team rounded on pt. Pt daughter is at the bedside. Provided support to pt daughter, she asked about having pt anointed. Call made to  who will arrange to have  in to [facility] pt today. Pt is in bed and minimally responsive.

## 2020-10-02 NOTE — HOSPICE RN NOTE
Rounded on pt, pt is in bed and is moaning and appears restless. Edenilson Maria is going to give pt another dose of ativan to help with this.

## 2020-10-02 NOTE — PAYOR COMM NOTE
--------------  DISCHARGE REVIEW    Payor: [address] [place] [address] #:  [id number]  Authorization Number: [id number]    Admit date: 9/25/20  Admit time:  1611  Discharge Date: 10/1/2020  4:56 PM     Admitting Physician: Carie Stroud MD  Attending

## 2020-10-02 NOTE — H&P
[address] [place] [address] Patient Status:  Inpatient    1940 MRN [id number]   Location [facility] 4W/SW/SE Attending Stephanie Lund MD   Hosp Day # 1 EDSON Mooney     Date:  10/2/2020  Date o docusate sodium 100 MG Oral Cap, Take 100 mg by mouth daily. •  amiodarone HCl 200 MG Oral Tab, Take 200 mg by mouth 2 (two) times daily.       •  Carbamide Peroxide (GLY-OXIDE) 10 % Mouth/Throat Solution, Use as directed 10 mL in the mouth or throat e extensive bilateral airspace disease suggesting improvement in bilateral pneumonia and or pulmonary edema. No large pleural effusion. Continued follow-up is advised.     Dictated by (CST): Wallace Jeans, MD on 10/01/2020 at 1:35 PM     Finalized by (CST):

## 2020-10-02 NOTE — PROGRESS NOTES
Pt only responds to pain and movement. Pt comes from CV. Had some dyspnea and restlessness. Morphine IVP and Ativan IVP given with relief. Morphine gtt in place. Pt on 6 LO2 for comfort. Mariscal in place. Pt resting comfortably. Endorsed oncoming RN.  Eloisa Castillo

## 2020-10-02 NOTE — PLAN OF CARE
Johanna Garzon is resting comfortably throughout the shift. Oral care provided as needed. Morphine gtt infusing to manage pain and respirations. Ativan given to manage restlessness. Mariscal catheter in place. Low output.     Family at bedside throughout the

## 2020-10-02 NOTE — SPIRITUAL CARE NOTE
S: Pt is non-responsive, appeared peaceful, not in pain. Daughter Mary Bueno and son at bed side. O: Pt has been through a few serious health issues in past few years.  Since the pandemic, Mary Bueno was able to bring his father from nursing home to her home and took c

## 2020-10-02 NOTE — CM/SW NOTE
During [facility], visit, MSW provided supportive presence to pt and emotional support to dtr, Dominique at bedsided. Dtr states that her brother is working on  plans. RH Team to follow up on  plan update and/or provide resources if necessary.

## 2020-10-02 NOTE — CM/SW NOTE
10/02/20 0900   CM/SW Referral Data   Referral Source    Reason for Referral Readmission   Informant Other  (chart review)     CM self-referred for re-admission. Per chart review pt's family has requested to transition pt to hospice.   Pt cu

## 2020-10-03 NOTE — PLAN OF CARE
Patient is minimally responsive, only to painful stimuli. On 8L of oxygen for comfort. Morphine gtt running at 4mg/hr. Oral care prn. Mariscal intact & draining small output. Heel boots in place. Mepilex to sacrum. Hospice/comfort measures maintained.  Safety identify pros/cons of alternative solutions  - Provide information as requested by patient/family  - Respect patient/family right to receive or not to receive information  - Serve as a liaison between patient and family and health care team  - Initiate Con in identifying painful feelings of guilt  - Help patient explore and identify spiritual beliefs, cultural understandings or values that may help or hinder letting go of issue  - Help patient explore feelings of anger, bitterness, resentment  - Help patient

## 2020-10-03 NOTE — PLAN OF CARE
Pt  at [facility]. Resusitation not attempted as pt was DNR.     Time of Death: 46    Family Notified: Yes  Name and Relation: Dakota Hurst (daughter)     MD: Yes (Dr. Ashwini Christopher)     [facility] [facility] Notified Yes Minor Severin #[id number])     contacted if applicable N/A

## 2020-10-06 ENCOUNTER — TELEPHONE (OUTPATIENT)
Dept: INTERNAL MEDICINE UNIT | Facility: HOSPITAL | Age: 80
End: 2020-10-06

## 2020-10-06 NOTE — DISCHARGE SUMMARY
Patient was admitted to inpatient hospice for end stage heart failure. He was started on morphine drip for comfort and passed away in hospice.       [facility]    Discharge Summary    Fanta Adam Patient Status:  Inpatient     carbidopa-levodopa  MG Tabs  Commonly known as: SINEMET      Take 1 tablet by mouth every 6 (six) hours.    Refills: 0     dextrose 5 % SOLN 100 mL with Piperacillin Sod-Tazobactam So 3.375 (3-0.375) g SOLR 3.375 g      Inject 3.375 g into the vein

## 2020-10-06 NOTE — TELEPHONE ENCOUNTER
Death certificate completed and signed by Hospitalist MD Dr. Omar Carpenter. Submitted via fax to [phone]. Confirmation fax received.

## (undated) NOTE — ED AVS SNAPSHOT
Chivo Hernandez   MRN: [id number]    Department:  [facility] Emergency Department   Date of Visit:  2/22/2020           Disclosure     Insurance plans vary and the physician(s) referred by the ER may not be covered by your plan.  Please contact yo within the next three months to obtain basic health screening including reassessment of your blood pressure.     IF THERE IS ANY CHANGE OR WORSENING OF YOUR CONDITION, CALL YOUR PRIMARY CARE PHYSICIAN AT ONCE OR RETURN IMMEDIATELY TO THE EMERGENCY DEPARTMEN